# Patient Record
Sex: MALE | Race: WHITE
[De-identification: names, ages, dates, MRNs, and addresses within clinical notes are randomized per-mention and may not be internally consistent; named-entity substitution may affect disease eponyms.]

---

## 2017-12-04 NOTE — RAD
AP VIEW CHEST:

 

Date:  12/04/17 

 

HISTORY:  

Chest pain. 

 

FINDINGS:

Comparison made to previous exam from 06/30/17. 

 

AP view of chest demonstrates the lungs to be well aerated. No evidence of active intrathoracic disea
se seen. No evidence of effusions, pneumonia, or pneumothorax seen. 

 

IMPRESSION: 

Unremarkable AP view of chest. 

 

 

 

 

POS: [facility]

## 2018-05-30 NOTE — ULT
RENAL ULTRASOUND:

 

HISTORY: 

Cystic kidney disease.  Multiple renal calculi.

 

COMPARISON: 

5/6/17, 12/5/17.

 

TECHNIQUE: 

Sagittal and transverse imaging of the kidneys is performed.

 

FINDINGS: 

There is bilateral renal cortical thinning.  No hydronephrosis.  The right kidney measures 7.3 x 5.8 
x 11.7 cm.  The left kidney measures 7.4 x 6.9 x 12.4 cm.  There are echogenic foci in the left and r
ight renal cortex suggesting cortical-based calcifications.  No evidence of obstruction.  There is an
 exophytic anechoic focus emanating from the inferior aspect of the left kidney, similar in location 
to the previous examination.  The lesion measures 5.4 x 4.6 x 4.2 cm.

 

Limited evaluation of the urinary bladder.  The prostate gland appears to be enlarged and causes mass
 effect upon the floor of the bladder.  Enlarged prostate gland measures 3.8 x 4.0 x 4. 3 cm.

 

IMPRESSION: 

1.  Redemonstration of an essentially stable cyst emanating from the lower pole of the left kidney.

 

2.  Punctate echogenic foci in the kidneys, compatible with  parenchymal calculi.

 

3.  Enlarged prostate gland.

 

POS: [facility]

## 2019-06-13 ENCOUNTER — HOSPITAL ENCOUNTER (OUTPATIENT)
Dept: HOSPITAL 92 - ULT | Age: 69
Discharge: HOME | End: 2019-06-13
Attending: UROLOGY
Payer: MEDICARE

## 2019-06-13 DIAGNOSIS — Q61.01: ICD-10-CM

## 2019-06-13 DIAGNOSIS — N40.1: Primary | ICD-10-CM

## 2019-06-13 DIAGNOSIS — Z87.442: ICD-10-CM

## 2019-06-13 PROCEDURE — 81001 URINALYSIS AUTO W/SCOPE: CPT

## 2019-06-13 PROCEDURE — 80048 BASIC METABOLIC PNL TOTAL CA: CPT

## 2019-06-13 PROCEDURE — 76770 US EXAM ABDO BACK WALL COMP: CPT

## 2019-06-13 PROCEDURE — 87086 URINE CULTURE/COLONY COUNT: CPT

## 2019-06-13 PROCEDURE — 74018 RADEX ABDOMEN 1 VIEW: CPT

## 2019-06-13 PROCEDURE — 36415 COLL VENOUS BLD VENIPUNCTURE: CPT

## 2019-06-13 NOTE — RAD
EXAM: Supine abdomen:



INDICATIONS: Enlarged prostate



COMPARISON: 5/4/2015



FINDINGS: Nonspecific bowel gas pattern. Scattered stool and gas throughout colon. Scattered small nika
wel gas without significant small bowel dilatation. No soft tissue mass effect or abnormal

calcification. Mild degenerative changes in the spine.



IMPRESSION: Nonspecific bowel gas pattern



Reported By: Pelon Shen 

Electronically Signed:  6/13/2019 9:56 AM

## 2019-06-13 NOTE — ULT
EXAM: Renal ultrasound:



INDICATIONS: Enlarged prostate



COMPARISON: None.



FINDINGS: Both kidneys measure approximately 12 cm length.

5 cm cyst from the inferior pole left kidney.

No hydronephrosis or renal mass lesion. Mild cortical thinning. Cortical echogenicity appears normal.


Urinary bladder contracted. Prevoid bladder volume recorded 18 cc.



IMPRESSION: Left renal cyst as described.



Reported By: Pelon Shen 

Electronically Signed:  6/13/2019 10:04 AM

## 2019-07-30 ENCOUNTER — HOSPITAL ENCOUNTER (OUTPATIENT)
Dept: HOSPITAL 92 - RAD | Age: 69
Discharge: HOME | End: 2019-07-30
Attending: UROLOGY
Payer: MEDICARE

## 2019-07-30 DIAGNOSIS — Q61.01: ICD-10-CM

## 2019-07-30 DIAGNOSIS — Z87.442: ICD-10-CM

## 2019-07-30 DIAGNOSIS — N40.1: Primary | ICD-10-CM

## 2019-07-30 PROCEDURE — 36415 COLL VENOUS BLD VENIPUNCTURE: CPT

## 2019-07-30 PROCEDURE — 74018 RADEX ABDOMEN 1 VIEW: CPT

## 2019-07-30 PROCEDURE — G0103 PSA SCREENING: HCPCS

## 2019-07-30 PROCEDURE — 81001 URINALYSIS AUTO W/SCOPE: CPT

## 2019-07-30 PROCEDURE — 87086 URINE CULTURE/COLONY COUNT: CPT

## 2019-07-30 NOTE — RAD
KUB:

 

HISTORY:

Enlarged prostate with urinary tract infection symptoms.  History of renal calculi and congenital riky
al cysts.

 

COMPARISON:

06/13/2019

 

FINDINGS:

A single view of the abdomen shows a nonspecific, nonobstructed bowel gas pattern.  No suspicious fidelia
cifications are seen.  Degenerative changes are seen in the spine.

 

IMPRESSION:

No urinary collecting system calcifications identified.

 

POS: [facility]